# Patient Record
Sex: FEMALE | Race: ASIAN | NOT HISPANIC OR LATINO | Employment: FULL TIME | URBAN - METROPOLITAN AREA
[De-identification: names, ages, dates, MRNs, and addresses within clinical notes are randomized per-mention and may not be internally consistent; named-entity substitution may affect disease eponyms.]

---

## 2021-08-30 ENCOUNTER — OFFICE VISIT (OUTPATIENT)
Dept: INTERNAL MEDICINE | Facility: CLINIC | Age: 26
End: 2021-08-30

## 2021-08-30 VITALS
TEMPERATURE: 98 F | HEIGHT: 61 IN | OXYGEN SATURATION: 98 % | BODY MASS INDEX: 24.17 KG/M2 | WEIGHT: 128 LBS | DIASTOLIC BLOOD PRESSURE: 74 MMHG | SYSTOLIC BLOOD PRESSURE: 100 MMHG | HEART RATE: 62 BPM

## 2021-08-30 DIAGNOSIS — R35.0 URINARY FREQUENCY: ICD-10-CM

## 2021-08-30 DIAGNOSIS — Z00.00 ROUTINE ADULT HEALTH MAINTENANCE: Primary | ICD-10-CM

## 2021-08-30 DIAGNOSIS — Z23 NEED FOR TDAP VACCINATION: ICD-10-CM

## 2021-08-30 LAB
BACTERIA UR QL AUTO: ABNORMAL /HPF
BILIRUB UR QL STRIP: NEGATIVE
CLARITY UR: ABNORMAL
COLOR UR: YELLOW
GLUCOSE UR STRIP-MCNC: NEGATIVE MG/DL
HGB UR QL STRIP.AUTO: ABNORMAL
HYALINE CASTS UR QL AUTO: ABNORMAL /LPF
KETONES UR QL STRIP: NEGATIVE
LEUKOCYTE ESTERASE UR QL STRIP.AUTO: ABNORMAL
NITRITE UR QL STRIP: NEGATIVE
PH UR STRIP.AUTO: 5.5 [PH] (ref 5–8)
PROT UR QL STRIP: NEGATIVE
RBC # UR: ABNORMAL /HPF
REF LAB TEST METHOD: ABNORMAL
SP GR UR STRIP: 1.02 (ref 1–1.03)
SQUAMOUS #/AREA URNS HPF: ABNORMAL /HPF
UROBILINOGEN UR QL STRIP: ABNORMAL
WBC UR QL AUTO: ABNORMAL /HPF

## 2021-08-30 PROCEDURE — 90715 TDAP VACCINE 7 YRS/> IM: CPT | Performed by: NURSE PRACTITIONER

## 2021-08-30 PROCEDURE — 99385 PREV VISIT NEW AGE 18-39: CPT | Performed by: NURSE PRACTITIONER

## 2021-08-30 PROCEDURE — 81001 URINALYSIS AUTO W/SCOPE: CPT | Performed by: NURSE PRACTITIONER

## 2021-08-30 PROCEDURE — 90471 IMMUNIZATION ADMIN: CPT | Performed by: NURSE PRACTITIONER

## 2021-08-30 NOTE — PROGRESS NOTES
"Chief Complaint  Establish Care (New PT ), Annual Exam, and Menstrual Problem    Subjective          Therese Cuevas presents to [facility]  History of Present Illness   This is a 27 y/o female presenting to office for establishment of care and for annual exam. Patient currently works as a  [employer]. Patient is currently  without children.     Patient had last pap smear/gynecological exam in 2018. Patient is not currently using birth control and is currently attempting to get pregnant.  Patient would like to see OBGYN for prenatal care and gynecological needs including PAP smear.     Patient denies tobacco use. Patient denies use of alcohol. Patient denies use of illicit drugs.     Patient reports sedentary lifestyle. Patient reports following healthy diet. Patient reports liking to do active activities with .     Patient reports increased urination-- patient denies dysuria or hematuria. Patient is interested in having UA+CS performed.     Patient reports also experiencing stye in left eye. Patient instructed about how to apply warm compresses.     Objective   Vital Signs:   /74   Pulse 62   Temp 98 °F (36.7 °C)   Ht 154 cm (60.63\")   Wt 58.1 kg (128 lb)   SpO2 98%   BMI 24.48 kg/m²     Physical Exam  Constitutional:       General: She is awake.      Appearance: Normal appearance.   HENT:      Head: Normocephalic.      Right Ear: Hearing and tympanic membrane normal.      Left Ear: Hearing and tympanic membrane normal.      Nose: Nose normal.      Mouth/Throat:      Lips: Pink.      Mouth: Mucous membranes are moist.      Pharynx: Oropharynx is clear.   Eyes:      Extraocular Movements: Extraocular movements intact.      Conjunctiva/sclera: Conjunctivae normal.      Pupils: Pupils are equal, round, and reactive to light.   Cardiovascular:      Rate and Rhythm: Normal rate and regular rhythm.      Pulses: Normal pulses.      Heart sounds: Normal heart " sounds. No murmur heard.   No friction rub. No gallop.    Pulmonary:      Effort: Pulmonary effort is normal. No respiratory distress.      Breath sounds: Normal breath sounds. No stridor. No wheezing, rhonchi or rales.   Abdominal:      General: Abdomen is flat. Bowel sounds are normal. There is no distension.      Palpations: Abdomen is soft. There is no mass.      Tenderness: There is no abdominal tenderness.   Musculoskeletal:         General: Normal range of motion.      Cervical back: Normal range of motion and neck supple.   Skin:     General: Skin is warm and dry.      Capillary Refill: Capillary refill takes less than 2 seconds.   Neurological:      General: No focal deficit present.      Mental Status: She is alert and oriented to person, place, and time. Mental status is at baseline.      Motor: Motor function is intact.      Coordination: Coordination is intact.      Gait: Gait is intact.      Deep Tendon Reflexes: Reflexes are normal and symmetric.   Psychiatric:         Attention and Perception: Attention normal.         Mood and Affect: Mood normal.         Speech: Speech normal.         Behavior: Behavior normal. Behavior is cooperative.         Thought Content: Thought content normal.         Cognition and Memory: Cognition normal.         Judgment: Judgment normal.        Result Review :            Assessment and Plan    Diagnoses and all orders for this visit:    1. Routine adult health maintenance (Primary)  Assessment & Plan:  Encouraged to participate in 150 minutes weekly exercise.  Continue with healthy diet choices according to USDA food pyramid.   A1C/CBC/CMP/Lipid panel/Hep C antibody today.   Pap smear to be completed   Continue with breast self examinations monthly.   Educated to begin taking prenatal vitamin since patient is trying for pregnancy.  Patient also educated regarding importance of COVID 19 precautions and vaccination.   Anticipatory guidance given regarding health  prevention, dietary measures, exercise recommendations, sexual health. Tobacco/alcohol use.       Orders:  -     CBC No Differential  -     Comprehensive metabolic panel  -     Lipid panel  -     Hemoglobin A1c  -     Hepatitis C Antibody  -     Ambulatory Referral to Obstetrics / Gynecology    2. Urinary frequency  -     Urinalysis With Culture If Indicated - Urine, Clean Catch  -     Urinalysis, Microscopic Only - Urine, Clean Catch  -     Urine Culture - Urine, Urine, Clean Catch      Follow Up   Return in about 1 year (around 8/30/2022) for Annual physical.  Patient was given instructions and counseling regarding her condition or for health maintenance advice. Please see specific information pulled into the AVS if appropriate.

## 2021-08-30 NOTE — PATIENT INSTRUCTIONS
Health Maintenance, Female  Adopting a healthy lifestyle and getting preventive care are important in promoting health and wellness. Ask your health care provider about:  · The right schedule for you to have regular tests and exams.  · Things you can do on your own to prevent diseases and keep yourself healthy.  What should I know about diet, weight, and exercise?  Eat a healthy diet    · Eat a diet that includes plenty of vegetables, fruits, low-fat dairy products, and lean protein.  · Do not eat a lot of foods that are high in solid fats, added sugars, or sodium.  Maintain a healthy weight  Body mass index (BMI) is used to identify weight problems. It estimates body fat based on height and weight. Your health care provider can help determine your BMI and help you achieve or maintain a healthy weight.  Get regular exercise  Get regular exercise. This is one of the most important things you can do for your health. Most adults should:  · Exercise for at least 150 minutes each week. The exercise should increase your heart rate and make you sweat (moderate-intensity exercise).  · Do strengthening exercises at least twice a week. This is in addition to the moderate-intensity exercise.  · Spend less time sitting. Even light physical activity can be beneficial.  Watch cholesterol and blood lipids  Have your blood tested for lipids and cholesterol at 20 years of age, then have this test every 5 years.  Have your cholesterol levels checked more often if:  · Your lipid or cholesterol levels are high.  · You are older than 40 years of age.  · You are at high risk for heart disease.  What should I know about cancer screening?  Depending on your health history and family history, you may need to have cancer screening at various ages. This may include screening for:  · Breast cancer.  · Cervical cancer.  · Colorectal cancer.  · Skin cancer.  · Lung cancer.  What should I know about heart disease, diabetes, and high blood  pressure?  Blood pressure and heart disease  · High blood pressure causes heart disease and increases the risk of stroke. This is more likely to develop in people who have high blood pressure readings, are of  descent, or are overweight.  · Have your blood pressure checked:  ? Every 3-5 years if you are 18-39 years of age.  ? Every year if you are 40 years old or older.  Diabetes  Have regular diabetes screenings. This checks your fasting blood sugar level. Have the screening done:  · Once every three years after age 40 if you are at a normal weight and have a low risk for diabetes.  · More often and at a younger age if you are overweight or have a high risk for diabetes.  What should I know about preventing infection?  Hepatitis B  If you have a higher risk for hepatitis B, you should be screened for this virus. Talk with your health care provider to find out if you are at risk for hepatitis B infection.  Hepatitis C  Testing is recommended for:  · Everyone born from 1945 through 1965.  · Anyone with known risk factors for hepatitis C.  Sexually transmitted infections (STIs)  · Get screened for STIs, including gonorrhea and chlamydia, if:  ? You are sexually active and are younger than 24 years of age.  ? You are older than 24 years of age and your health care provider tells you that you are at risk for this type of infection.  ? Your sexual activity has changed since you were last screened, and you are at increased risk for chlamydia or gonorrhea. Ask your health care provider if you are at risk.  · Ask your health care provider about whether you are at high risk for HIV. Your health care provider may recommend a prescription medicine to help prevent HIV infection. If you choose to take medicine to prevent HIV, you should first get tested for HIV. You should then be tested every 3 months for as long as you are taking the medicine.  Pregnancy  · If you are about to stop having your period (premenopausal) and  you may become pregnant, seek counseling before you get pregnant.  · Take 400 to 800 micrograms (mcg) of folic acid every day if you become pregnant.  · Ask for birth control (contraception) if you want to prevent pregnancy.  Osteoporosis and menopause  Osteoporosis is a disease in which the bones lose minerals and strength with aging. This can result in bone fractures. If you are 65 years old or older, or if you are at risk for osteoporosis and fractures, ask your health care provider if you should:  · Be screened for bone loss.  · Take a calcium or vitamin D supplement to lower your risk of fractures.  · Be given hormone replacement therapy (HRT) to treat symptoms of menopause.  Follow these instructions at home:  Lifestyle  · Do not use any products that contain nicotine or tobacco, such as cigarettes, e-cigarettes, and chewing tobacco. If you need help quitting, ask your health care provider.  · Do not use street drugs.  · Do not share needles.  · Ask your health care provider for help if you need support or information about quitting drugs.  Alcohol use  · Do not drink alcohol if:  ? Your health care provider tells you not to drink.  ? You are pregnant, may be pregnant, or are planning to become pregnant.  · If you drink alcohol:  ? Limit how much you use to 0-1 drink a day.  ? Limit intake if you are breastfeeding.  · Be aware of how much alcohol is in your drink. In the [place] one drink equals one 12 oz bottle of beer (355 mL), one 5 oz glass of wine (148 mL), or one 1½ oz glass of hard liquor (44 mL).  General instructions  · Schedule regular health, dental, and eye exams.  · Stay current with your vaccines.  · Tell your health care provider if:  ? You often feel depressed.  ? You have ever been abused or do not feel safe at home.  Summary  · Adopting a healthy lifestyle and getting preventive care are important in promoting health and wellness.  · Follow your health care provider's instructions about healthy  diet, exercising, and getting tested or screened for diseases.  · Follow your health care provider's instructions on monitoring your cholesterol and blood pressure.  This information is not intended to replace advice given to you by your health care provider. Make sure you discuss any questions you have with your health care provider.  Document Revised: 12/11/2019 Document Reviewed: 12/11/2019  [employer] Patient Education © 2021 [employer]    Immunization Schedule, 22-26 Years Old    Vaccines are usually given at various ages, according to a schedule. Your health care provider will recommend vaccines for you based on your age, medical history, and lifestyle or other factors, such as travel or where you work.  You may receive vaccines as individual doses or as more than one vaccine together in one shot (combination vaccine). Talk with your health care provider about the risks and benefits of combination vaccines.  Recommended immunizations for 22-26 years old  Influenza vaccine  · You should get a dose of the influenza vaccine every year.  Tetanus, diphtheria, and pertussis vaccine  A vaccine that protects against tetanus, diphtheria, and pertussis is known as the Tdap vaccine. A vaccine that protects against tetanus and diphtheria is known as the Td vaccine.  · You should only get the Td vaccine if you have had at least 1 dose of the Tdap vaccine.  · You should get 1 dose of the Td or Tdap vaccine every 10 years, or you should get 1 dose of the Tdap vaccine if:  ? You have not previously gotten a Tdap vaccine.  ? You do not know if you have ever gotten a Tdap vaccine.  ? You are between 27 and 36 weeks pregnant.  Measles, mumps, and rubella vaccine  This is also known as the MMR vaccine. You may need to get the MMR vaccine if:  · You need to catch up on doses you missed in the past.  · You have not been given the vaccine before.  · You do not have evidence of immunity (by a blood test).  Pregnant women should not  get the MMR vaccine during pregnancy because it may be harmful to the unborn baby. However, if you are not immune to measles, mumps, or rubella, you should get a dose of MMR vaccine one month or more before pregnancy or within days after delivery.  Varicella vaccine  This is also known as the NICOLASA vaccine. You may need to get the NICOLASA vaccine if:  · You need to catch up on doses you missed in the past.  · You have not been given the vaccine before.  · You do not have evidence of immunity (by a blood test).  · You have certain high-risk conditions, such as HIV or AIDS.  Pregnant women should not get the NICOLASA vaccine during pregnancy because it may be harmful to the unborn baby. However, if you are not immune to chickenpox (varicella), you should get a dose of the NICOLASA vaccine within days after delivery.  Human papillomavirus vaccine  This is also known as the HPV vaccine. You should get the HPV vaccine if:  · You have not gotten the vaccine before. The vaccine is recommended for all adults through age 26.  · You need to catch up on doses you missed in the past.  Pneumococcal conjugate vaccine  This is also known as the PCV13 vaccine. You should get the PCV13 vaccine as recommended if you have certain high-risk conditions. These include:  · Diabetes.  · Chronic conditions of the heart, lungs, or liver.  · Conditions that affect the body's disease-fighting system (immune system).  Pneumococcal polysaccharide vaccine  This is also known as the PPSV23 vaccine. You should get the PPSV23 vaccine as recommended if you have certain high-risk conditions. These include:  · Diabetes.  · Chronic conditions of the heart, lungs, or liver.  · Conditions that affect the immune system.  Hepatitis A vaccine  This is also known as the HepA vaccine. If you did not get the HepA vaccine previously, you should get it if:  · You are at risk for a hepatitis A infection. You may be at risk for infection if you:  ? Have chronic liver  disease.  ? Have HIV or AIDS.  ? Are a man who has sex with men.  ? Use drugs.  ? Are homeless.  ? May be exposed to hepatitis A through work.  ? Travel to countries where hepatitis A is common.  ? Are pregnant.  ? Have or will have close contact with someone who was adopted from another country.  · You are not at risk for infection but want protection from hepatitis A.  Hepatitis B vaccine  This is also known as the HepB vaccine. If you did not get the HepB vaccine previously, you should get it if:  · You are at risk for hepatitis B infection. You are at risk if you:  ? Have chronic liver disease.  ? Have HIV or AIDS.  ? Have sex with a partner who has hepatitis B, or:  § You have multiple sex partners.  § You are a man who has sex with men.  ? Use drugs.  ? May be exposed to hepatitis B through work.  ? Live with someone who has hepatitis B.  ? Receive dialysis treatment.  ? Have diabetes.  ? Travel to countries where hepatitis B is common.  ? Are pregnant.  · You are not at risk of infection but want protection from hepatitis B.  Meningococcal conjugate vaccine  This is also known as the MenACWY vaccine. You may need to get the MenACWY vaccine if you:  · Have not been given the vaccine before.  · Need to catch up on doses you missed in the past.  This vaccine is especially important if you:  · Do not have a spleen.  · Have sickle cell disease.  · Have HIV.  · Take medicines that suppress your immune system.  · Travel to countries where meningococcal disease is common.  · Are exposed to Neisseria meningitidis at work.  Serogroup B meningococcal vaccine  This is also known as the MenB vaccine. You may need to get the MenB vaccine if you:  · Have not been given the vaccine before.  · Need to catch up on doses you missed in the past.  This vaccine is especially important if you:  · Do not have a spleen.  · Have sickle cell disease.  · Take medicines that suppress your immune system.  · Are exposed to Neisseria  meningitidis at work.  Haemophilus influenzae type b vaccine  This is also known as the Hib vaccine. Anyone older than 5 years of age is usually not given the Hib vaccine. However, if you have certain high-risk conditions, you may need to get this vaccine. These conditions include:  · Not having a spleen.  · Having received a stem cell transplant.  Before you get a vaccine:  Talk with your health care provider about which vaccines are right for you. This is especially important if:  · You previously had a reaction after getting a vaccine.  · You have a weakened immune system. You may have a weakened immune system if you:  ? Are taking medicines that reduce (suppress) the activity of your immune system.  ? Are taking medicines to treat cancer (chemotherapy).  ? Have HIV or AIDS.  · You work in an environment where you may be exposed to a disease.  · You plan to travel outside of the country.  · You have a chronic illness, such as heart disease, kidney disease, diabetes, or lung disease.  · You are pregnant, think you may be pregnant, or are planning to become pregnant.  Summary  · Before you get a vaccine, tell your health care provider if you have reacted to vaccines in the past or have a condition that weakens your immune system.  · At 22-26 years, you should get a dose of the flu vaccine every year and a dose of the Td or Tdap vaccine every 10 years.  · Depending on your medical history and your risk factors, you may need other vaccines. Ask your health care provider whether you are up to date on all your vaccines.  · Women who are pregnant may not receive certain vaccines. Ask your health care provider whether you should receive any vaccines soon after you deliver your baby.  This information is not intended to replace advice given to you by your health care provider. Make sure you discuss any questions you have with your health care provider.  Document Revised: 10/13/2020 Document Reviewed: 10/13/2020  [employer]  Patient Education © 2021 [employer]    Prenatal Care  Prenatal care is health care during pregnancy. It helps you and your unborn baby (fetus) stay as healthy as possible. Prenatal care may be provided by a midwife, a family practice health care provider, or a childbirth and pregnancy specialist (obstetrician).  How does this affect me?  During pregnancy, you will be closely monitored for any new conditions that might develop. To lower your risk of pregnancy complications, you and your health care provider will talk about any underlying conditions you have.  How does this affect my baby?  Early and consistent prenatal care increases the chance that your baby will be healthy during pregnancy. Prenatal care lowers the risk that your baby will be:  · Born early (prematurely).  · Smaller than expected at birth (small for gestational age).  What can I expect at the first prenatal care visit?  Your first prenatal care visit will likely be the longest. You should schedule your first prenatal care visit as soon as you know that you are pregnant. Your first visit is a good time to talk about any questions or concerns you have about pregnancy. At your visit, you and your health care provider will talk about:  · Your medical history, including:  ? Any past pregnancies.  ? Your family's medical history.  ? The baby's father's medical history.  ? Any long-term (chronic) health conditions you have and how you manage them.  ? Any surgeries or procedures you have had.  ? Any current over-the-counter or prescription medicines, herbs, or supplements you are taking.  · Other factors that could pose a risk to your baby, including:  · Your home setting and your stress levels, including:  ? Exposure to abuse or violence.  ? Household financial strain.  ? Mental health conditions you have.  · Your daily health habits, including diet and exercise.  Your health care provider will also:  · Measure your weight, height, and blood  pressure.  · Do a physical exam, including a pelvic and breast exam.  · Perform blood tests and urine tests to check for:  ? Urinary tract infection.  ? Sexually transmitted infections (STIs).  ? Low iron levels in your blood (anemia).  ? Blood type and certain proteins on red blood cells (Rh antibodies).  ? Infections and immunity to viruses, such as hepatitis B and rubella.  ? HIV (human immunodeficiency virus).  · Do an ultrasound to confirm your baby's growth and development and to help predict your estimated due date (GIA). This ultrasound is done with a probe that is inserted into the vagina (transvaginal ultrasound).  · Discuss your options for genetic screening.  · Give you information about how to keep yourself and your baby healthy, including:  ? Nutrition and taking vitamins.  ? Physical activity.  ? How to manage pregnancy symptoms such as nausea and vomiting (morning sickness).  ? Infections and substances that may be harmful to your baby and how to avoid them.  ? Food safety.  ? Dental care.  ? Working.  ? Travel.  ? Warning signs to watch for and when to call your health care provider.  How often will I have prenatal care visits?  After your first prenatal care visit, you will have regular visits throughout your pregnancy. The visit schedule is often as follows:  · Up to week 28 of pregnancy: once every 4 weeks.  · 28-36 weeks: once every 2 weeks.  · After 36 weeks: every week until delivery.  Some women may have visits more or less often depending on any underlying health conditions and the health of the baby.  Keep all follow-up and prenatal care visits as told by your health care provider. This is important.  What happens during routine prenatal care visits?  Your health care provider will:  · Measure your weight and blood pressure.  · Check for fetal heart sounds.  · Measure the height of your uterus in your abdomen (fundal height). This may be measured starting around week 20 of  pregnancy.  · Check the position of your baby inside your uterus.  · Ask questions about your diet, sleeping patterns, and whether you can feel the baby move.  · Review warning signs to watch for and signs of labor.  · Ask about any pregnancy symptoms you are having and how you are dealing with them. Symptoms may include:  ? Headaches.  ? Nausea and vomiting.  ? Vaginal discharge.  ? Swelling.  ? Fatigue.  ? Constipation.  ? Any discomfort, including back or pelvic pain.  Make a list of questions to ask your health care provider at your routine visits.  What tests might I have during prenatal care visits?  You may have blood, urine, and imaging tests throughout your pregnancy, such as:  · Urine tests to check for glucose, protein, or signs of infection.  · Glucose tests to check for a form of diabetes that can develop during pregnancy (gestational diabetes mellitus). This is usually done around week 24 of pregnancy.  · An ultrasound to check your baby's growth and development and to check for birth defects. This is usually done around week 20 of pregnancy.  · A test to check for group B strep (GBS) infection. This is usually done around week 36 of pregnancy.  · Genetic testing. This may include blood or imaging tests, such as an ultrasound. Some genetic tests are done during the first trimester and some are done during the second trimester.  What else can I expect during prenatal care visits?  Your health care provider may recommend getting certain vaccines during pregnancy. These may include:  · A yearly flu shot (annual influenza vaccine). This is especially important if you will be pregnant during flu season.  · Tdap (tetanus, diphtheria, pertussis) vaccine. Getting this vaccine during pregnancy can protect your baby from whooping cough (pertussis) after birth. This vaccine may be recommended between weeks 27 and 36 of pregnancy.  Later in your pregnancy, your health care provider may give you information  about:  · Childbirth and breastfeeding classes.  · Choosing a health care provider for your baby.  · Umbilical cord banking.  · Breastfeeding.  · Birth control after your baby is born.  · The hospital labor and delivery unit and how to tour it.  · Registering at the hospital before you go into labor.  Where to find more information  · Office on Women's Health: womenshealth.gov  · American Pregnancy Association: americanpregnancy.org  · March of Dimes: marchofdimes.org  Summary  · Prenatal care helps you and your baby stay as healthy as possible during pregnancy.  · Your first prenatal care visit will most likely be the longest.  · You will have visits and tests throughout your pregnancy to monitor your health and your baby's health.  · Bring a list of questions to your visits to ask your health care provider.  · Make sure to keep all follow-up and prenatal care visits with your health care provider.  This information is not intended to replace advice given to you by your health care provider. Make sure you discuss any questions you have with your health care provider.  Document Revised: 04/08/2020 Document Reviewed: 12/17/2018  [employer] Patient Education © 2021 [employer]    Preventive Care 21-39 Years Old, Female  Preventive care refers to lifestyle choices and visits with your health care provider that can promote health and wellness. This includes:  · A yearly physical exam. This is also called an annual wellness visit.  · Regular dental and eye exams.  · Immunizations.  · Screening for certain conditions.  · Healthy lifestyle choices, such as:  ? Eating a healthy diet.  ? Getting regular exercise.  ? Not using drugs or products that contain nicotine and tobacco.  ? Limiting alcohol use.  What can I expect for my preventive care visit?  Physical exam  Your health care provider may check your:  · Height and weight. These may be used to calculate your BMI (body mass index). BMI is a measurement that tells if you  are at a healthy weight.  · Heart rate and blood pressure.  · Body temperature.  · Skin for abnormal spots.  Counseling  Your health care provider may ask you questions about your:  · Past medical problems.  · Family's medical history.  · Alcohol, tobacco, and drug use.  · Emotional well-being.  · Home life and relationship well-being.  · Sexual activity.  · Diet, exercise, and sleep habits.  · Work and work environment.  · Access to firearms.  · Method of birth control.  · Menstrual cycle.  · Pregnancy history.  What immunizations do I need?    Vaccines are usually given at various ages, according to a schedule. Your health care provider will recommend vaccines for you based on your age, medical history, and lifestyle or other factors, such as travel or where you work.  What tests do I need?    Blood tests  · Lipid and cholesterol levels. These may be checked every 5 years starting at age 20.  · Hepatitis C test.  · Hepatitis B test.  Screening  · Diabetes screening. This is done by checking your blood sugar (glucose) after you have not eaten for a while (fasting).  · STD (sexually transmitted disease) testing, if you are at risk.  · BRCA-related cancer screening. This may be done if you have a family history of breast, ovarian, tubal, or peritoneal cancers.  · Pelvic exam and Pap test. This may be done every 3 years starting at age 21. Starting at age 30, this may be done every 5 years if you have a Pap test in combination with an HPV test.  Talk with your health care provider about your test results, treatment options, and if necessary, the need for more tests.  Follow these instructions at home:  Eating and drinking    · Eat a healthy diet that includes fresh fruits and vegetables, whole grains, lean protein, and low-fat dairy products.  · Take vitamin and mineral supplements as recommended by your health care provider.  · Do not drink alcohol if:  ? Your health care provider tells you not to drink.  ? You are  pregnant, may be pregnant, or are planning to become pregnant.  · If you drink alcohol:  ? Limit how much you have to 0-1 drink a day.  ? Be aware of how much alcohol is in your drink. In the [place] one drink equals one 12 oz bottle of beer (355 mL), one 5 oz glass of wine (148 mL), or one 1½ oz glass of hard liquor (44 mL).  Lifestyle  · Take daily care of your teeth and gums. Brush your teeth every morning and night with fluoride toothpaste. Floss one time each day.  · Stay active. Exercise for at least 30 minutes 5 or more days each week.  · Do not use any products that contain nicotine or tobacco, such as cigarettes, e-cigarettes, and chewing tobacco. If you need help quitting, ask your health care provider.  · Do not use drugs.  · If you are sexually active, practice safe sex. Use a condom or other form of birth control (contraception) in order to prevent pregnancy and STIs (sexually transmitted infections). If you plan to become pregnant, see your health care provider for a pre-conception visit.  · Find healthy ways to cope with stress, such as:  ? Meditation, yoga, or listening to music.  ? Journaling.  ? Talking to a trusted person.  ? Spending time with friends and family.  Safety  · Always wear your seat belt while driving or riding in a vehicle.  · Do not drive:  ? If you have been drinking alcohol. Do not ride with someone who has been drinking.  ? When you are tired or distracted.  ? While texting.  · Wear a helmet and other protective equipment during sports activities.  · If you have firearms in your house, make sure you follow all gun safety procedures.  · Seek help if you have been physically or sexually abused.  What's next?  · Go to your health care provider once a year for an annual wellness visit.  · Ask your health care provider how often you should have your eyes and teeth checked.  · Stay up to date on all vaccines.  This information is not intended to replace advice given to you by your  health care provider. Make sure you discuss any questions you have with your health care provider.  Document Revised: 09/02/2020 Document Reviewed: 08/29/2019  Elsevier Patient Education © 2021 [employer]

## 2021-08-30 NOTE — ASSESSMENT & PLAN NOTE
Encouraged to participate in 150 minutes weekly exercise.  Continue with healthy diet choices according to USDA food pyramid.   A1C/CBC/CMP/Lipid panel/Hep C antibody today.   Pap smear to be completed   Continue with breast self examinations monthly.   Educated to begin taking prenatal vitamin since patient is trying for pregnancy.  Patient also educated regarding importance of COVID 19 precautions and vaccination.   Anticipatory guidance given regarding health prevention, dietary measures, exercise recommendations, sexual health. Tobacco/alcohol use.

## 2021-08-31 LAB
ALBUMIN SERPL-MCNC: 5.2 G/DL (ref 3.5–5.2)
ALBUMIN/GLOB SERPL: 1.8 G/DL
ALP SERPL-CCNC: 38 U/L (ref 39–117)
ALT SERPL-CCNC: 16 U/L (ref 1–33)
AST SERPL-CCNC: 18 U/L (ref 1–32)
BILIRUB SERPL-MCNC: 0.3 MG/DL (ref 0–1.2)
BUN SERPL-MCNC: 11 MG/DL (ref 6–20)
BUN/CREAT SERPL: 16.4 (ref 7–25)
CALCIUM SERPL-MCNC: 9.9 MG/DL (ref 8.6–10.5)
CHLORIDE SERPL-SCNC: 102 MMOL/L (ref 98–107)
CHOLEST SERPL-MCNC: 177 MG/DL (ref 0–200)
CO2 SERPL-SCNC: 25.1 MMOL/L (ref 22–29)
CREAT SERPL-MCNC: 0.67 MG/DL (ref 0.57–1)
ERYTHROCYTE [DISTWIDTH] IN BLOOD BY AUTOMATED COUNT: 12.3 % (ref 12.3–15.4)
GLOBULIN SER CALC-MCNC: 2.9 GM/DL
GLUCOSE SERPL-MCNC: 85 MG/DL (ref 65–99)
HBA1C MFR BLD: 5.3 % (ref 4.8–5.6)
HCT VFR BLD AUTO: 46 % (ref 34–46.6)
HCV AB S/CO SERPL IA: <0.1 S/CO RATIO (ref 0–0.9)
HDLC SERPL-MCNC: 67 MG/DL (ref 40–60)
HGB BLD-MCNC: 14.7 G/DL (ref 12–15.9)
LDLC SERPL CALC-MCNC: 91 MG/DL (ref 0–100)
MCH RBC QN AUTO: 28.5 PG (ref 26.6–33)
MCHC RBC AUTO-ENTMCNC: 32 G/DL (ref 31.5–35.7)
MCV RBC AUTO: 89.3 FL (ref 79–97)
PLATELET # BLD AUTO: 433 10*3/MM3 (ref 140–450)
POTASSIUM SERPL-SCNC: 4.1 MMOL/L (ref 3.5–5.2)
PROT SERPL-MCNC: 8.1 G/DL (ref 6–8.5)
RBC # BLD AUTO: 5.15 10*6/MM3 (ref 3.77–5.28)
SODIUM SERPL-SCNC: 138 MMOL/L (ref 136–145)
TRIGL SERPL-MCNC: 107 MG/DL (ref 0–150)
VLDLC SERPL CALC-MCNC: 19 MG/DL (ref 5–40)
WBC # BLD AUTO: 6.89 10*3/MM3 (ref 3.4–10.8)

## 2021-08-31 NOTE — PROGRESS NOTES
Please let patient know her preliminary UA is indicated that there is bacteria in her urine (Most likely UTI). Once we get the culture back with sensitivity, I will send an antibiotic to her pharmacy.

## 2021-09-01 LAB
BACTERIA UR CULT: NORMAL
BACTERIA UR CULT: NORMAL

## 2021-09-10 LAB — REF LAB TEST METHOD: NORMAL

## 2024-05-06 ENCOUNTER — OFFICE VISIT (OUTPATIENT)
Dept: INTERNAL MEDICINE | Facility: CLINIC | Age: 29
End: 2024-05-06
Payer: COMMERCIAL

## 2024-05-06 VITALS
OXYGEN SATURATION: 98 % | HEIGHT: 61 IN | DIASTOLIC BLOOD PRESSURE: 80 MMHG | BODY MASS INDEX: 24.55 KG/M2 | WEIGHT: 130 LBS | SYSTOLIC BLOOD PRESSURE: 130 MMHG | HEART RATE: 68 BPM

## 2024-05-06 DIAGNOSIS — H10.9 BACTERIAL CONJUNCTIVITIS OF RIGHT EYE: ICD-10-CM

## 2024-05-06 DIAGNOSIS — H10.9 BACTERIAL CONJUNCTIVITIS OF RIGHT EYE: Primary | ICD-10-CM

## 2024-05-06 PROCEDURE — 99213 OFFICE O/P EST LOW 20 MIN: CPT | Performed by: NURSE PRACTITIONER

## 2024-05-06 RX ORDER — VITAMIN A ACETATE, BETA CAROTENE, ASCORBIC ACID, CHOLECALCIFEROL, .ALPHA.-TOCOPHEROL ACETATE, DL-, THIAMINE MONONITRATE, RIBOFLAVIN, NIACINAMIDE, PYRIDOXINE HYDROCHLORIDE, FOLIC ACID, CYANOCOBALAMIN, CALCIUM CARBONATE, FERROUS FUMARATE, ZINC OXIDE, CUPRIC OXIDE 3080; 12; 120; 400; 1; 1.84; 3; 20; 22; 920; 25; 200; 27; 10; 2 [IU]/1; UG/1; MG/1; [IU]/1; MG/1; MG/1; MG/1; MG/1; MG/1; [IU]/1; MG/1; MG/1; MG/1; MG/1; MG/1
TABLET, FILM COATED ORAL DAILY
COMMUNITY

## 2024-05-06 RX ORDER — FOLIC ACID 0.8 MG
TABLET ORAL
COMMUNITY

## 2024-05-06 RX ORDER — CIPROFLOXACIN HYDROCHLORIDE 3.5 MG/ML
1 SOLUTION/ DROPS TOPICAL 4 TIMES DAILY
Qty: 2.5 ML | Refills: 0 | Status: SHIPPED | OUTPATIENT
Start: 2024-05-06 | End: 2024-05-06 | Stop reason: SDUPTHER

## 2024-05-07 RX ORDER — CIPROFLOXACIN HYDROCHLORIDE 3.5 MG/ML
1 SOLUTION/ DROPS TOPICAL 4 TIMES DAILY
Qty: 2.5 ML | Refills: 0 | Status: SHIPPED | OUTPATIENT
Start: 2024-05-07 | End: 2024-05-14

## 2024-05-22 ENCOUNTER — OFFICE VISIT (OUTPATIENT)
Dept: INTERNAL MEDICINE | Facility: CLINIC | Age: 29
End: 2024-05-22
Payer: COMMERCIAL

## 2024-05-22 VITALS
DIASTOLIC BLOOD PRESSURE: 80 MMHG | SYSTOLIC BLOOD PRESSURE: 120 MMHG | OXYGEN SATURATION: 99 % | WEIGHT: 136 LBS | HEART RATE: 73 BPM | HEIGHT: 61 IN | BODY MASS INDEX: 25.68 KG/M2

## 2024-05-22 DIAGNOSIS — E66.3 OVERWEIGHT WITH BODY MASS INDEX (BMI) OF 26 TO 26.9 IN ADULT: ICD-10-CM

## 2024-05-22 DIAGNOSIS — M25.562 ACUTE PAIN OF LEFT KNEE: ICD-10-CM

## 2024-05-22 DIAGNOSIS — Z00.00 ANNUAL PHYSICAL EXAM: Primary | ICD-10-CM

## 2024-05-22 PROCEDURE — 99395 PREV VISIT EST AGE 18-39: CPT | Performed by: NURSE PRACTITIONER

## 2024-05-22 PROCEDURE — 99213 OFFICE O/P EST LOW 20 MIN: CPT | Performed by: NURSE PRACTITIONER

## 2024-05-22 NOTE — PROGRESS NOTES
"Chief Complaint  Annual Exam and Knee Problem    Subjective        Therese Cuevas presents to [facility]  History of Present Illness  This is a 28 y/o female presenting to office for CPE and complaint of knee issue.     Reports some exercise; trying to follow healthy diet choices  Currently, breast feeding-- child now 10 months.     Patient reports she is currently newly pregnant-- took test last Tuesday and it was positive; has f/u with OBGYN scheduled.    Following with gynecology-- Jaye Olsen   Pap smear 2022     Denies tobacco use.     Reports left knee pain started 1.5 weeks ago; reports she may have injured it by placing some sort of weight on it with her other leg; reports she is still having some tenderness. Denies any trauma or falling. Reports she is taking tylenol as needed.     UTD with dental exam     Objective   Vital Signs:  /80 (BP Location: Left arm, Patient Position: Sitting, Cuff Size: Adult)   Pulse 73   Ht 154 cm (60.63\")   Wt 61.7 kg (136 lb)   SpO2 99%   BMI 26.01 kg/m²   Estimated body mass index is 26.01 kg/m² as calculated from the following:    Height as of this encounter: 154 cm (60.63\").    Weight as of this encounter: 61.7 kg (136 lb).       BMI is >= 25 and <30. (Overweight) The following options were offered after discussion;: exercise counseling/recommendations and nutrition counseling/recommendations      Physical Exam  Constitutional:       General: She is awake.      Appearance: Normal appearance.   HENT:      Head: Normocephalic and atraumatic.      Right Ear: Hearing, tympanic membrane, ear canal and external ear normal.      Left Ear: Hearing, tympanic membrane, ear canal and external ear normal.      Nose: Nose normal.      Mouth/Throat:      Lips: Pink.      Mouth: Mucous membranes are moist.      Pharynx: Oropharynx is clear.   Eyes:      Extraocular Movements: Extraocular movements intact.      Conjunctiva/sclera: Conjunctivae " normal.      Pupils: Pupils are equal, round, and reactive to light.   Cardiovascular:      Rate and Rhythm: Normal rate and regular rhythm.      Pulses: Normal pulses.      Heart sounds: Normal heart sounds. No murmur heard.     No gallop.   Pulmonary:      Effort: Pulmonary effort is normal. No respiratory distress.      Breath sounds: Normal breath sounds. No wheezing or rales.   Abdominal:      General: Abdomen is flat. Bowel sounds are normal. There is no distension.      Palpations: Abdomen is soft.      Tenderness: There is no abdominal tenderness.   Musculoskeletal:         General: Tenderness present. No swelling or deformity.      Cervical back: Normal range of motion and neck supple.      Left knee: Decreased range of motion. Tenderness present.   Lymphadenopathy:      Cervical: No cervical adenopathy.   Skin:     General: Skin is warm and dry.      Capillary Refill: Capillary refill takes less than 2 seconds.      Findings: No bruising.   Neurological:      General: No focal deficit present.      Mental Status: She is alert and oriented to person, place, and time. Mental status is at baseline.      Motor: Motor function is intact.      Coordination: Coordination is intact.      Gait: Gait is intact.      Deep Tendon Reflexes: Reflexes are normal and symmetric.   Psychiatric:         Attention and Perception: Attention normal.         Mood and Affect: Mood normal.         Speech: Speech normal.         Behavior: Behavior normal. Behavior is cooperative.         Thought Content: Thought content normal.         Cognition and Memory: Cognition normal.         Judgment: Judgment normal.        Result Review :    The following data was reviewed by: EBONI Fernandez on 05/22/2024:  Tobacco Use: Low Risk  (5/22/2024)    Patient History     Smoking Tobacco Use: Never     Smokeless Tobacco Use: Never     Passive Exposure: Not on file     Social History     Substance and Sexual Activity   Alcohol Use Never      Family History   Problem Relation Age of Onset    No Known Problems Other                     Assessment and Plan     Diagnoses and all orders for this visit:    1. Annual physical exam (Primary)  Assessment & Plan:  Recommended 150-300 min weekly exercise as tolerated-- may need to avoid exercise for now until cleared by PT  Continue with healthy diet choices  Labs ordered  Pap smear UTD 2022-- deferred to gynecology  Recommended monthly self breast examinations  Anticipatory guidance given regarding health prevention/wellness, diet/exercise, tobacco/alcohol/drug education, exercise and wellbeing, covid 19 guidance, vaccination recommendations, and sexual health/STD education.   Recommended bi-yearly dental exams and regular vision examinations.       Orders:  -     CBC & Differential  -     Comprehensive metabolic panel  -     TSH Rfx On Abnormal To Free T4  -     Hemoglobin A1c  -     LDL cholesterol, direct    2. Acute pain of left knee  Assessment & Plan:  Due to pregnancy-- will need to avoid NSAIDs  Can use tylenol PRN OTC for pain relief  Biofreeze topically as needed  Referral placed to PT  Hand out provided  RTO if symptoms worsen or do not improve    Orders:  -     Ambulatory Referral to Physical Therapy    3. Overweight with body mass index (BMI) of 26 to 26.9 in adult  Assessment & Plan:  BMI is >= 25 and <30. (Overweight) The following options were offered after discussion;: exercise counseling/recommendations and nutrition counseling/recommendations  Currently pregnant                Follow Up     Return in about 1 year (around 5/22/2025) for Annual physical.  Patient was given instructions and counseling regarding her condition or for health maintenance advice. Please see specific information pulled into the AVS if appropriate.

## 2024-05-22 NOTE — ASSESSMENT & PLAN NOTE
Due to pregnancy-- will need to avoid NSAIDs  Can use tylenol PRN OTC for pain relief  Biofreeze topically as needed  Referral placed to PT  Hand out provided  RTO if symptoms worsen or do not improve

## 2024-05-22 NOTE — ASSESSMENT & PLAN NOTE
Recommended 150-300 min weekly exercise as tolerated-- may need to avoid exercise for now until cleared by PT  Continue with healthy diet choices  Labs ordered  Pap smear UTD 2022-- deferred to gynecology  Recommended monthly self breast examinations  Anticipatory guidance given regarding health prevention/wellness, diet/exercise, tobacco/alcohol/drug education, exercise and wellbeing, covid 19 guidance, vaccination recommendations, and sexual health/STD education.   Recommended bi-yearly dental exams and regular vision examinations.

## 2024-05-22 NOTE — ASSESSMENT & PLAN NOTE
BMI is >= 25 and <30. (Overweight) The following options were offered after discussion;: exercise counseling/recommendations and nutrition counseling/recommendations  Currently pregnant

## 2024-05-23 LAB
ALBUMIN SERPL-MCNC: 4.8 G/DL (ref 3.5–5.2)
ALBUMIN/GLOB SERPL: 1.9 G/DL
ALP SERPL-CCNC: 48 U/L (ref 39–117)
ALT SERPL-CCNC: 19 U/L (ref 1–33)
AST SERPL-CCNC: 18 U/L (ref 1–32)
BASOPHILS # BLD AUTO: 0.07 10*3/MM3 (ref 0–0.2)
BASOPHILS NFR BLD AUTO: 0.8 % (ref 0–1.5)
BILIRUB SERPL-MCNC: 0.4 MG/DL (ref 0–1.2)
BUN SERPL-MCNC: 11 MG/DL (ref 6–20)
BUN/CREAT SERPL: 20.4 (ref 7–25)
CALCIUM SERPL-MCNC: 9.9 MG/DL (ref 8.6–10.5)
CHLORIDE SERPL-SCNC: 103 MMOL/L (ref 98–107)
CO2 SERPL-SCNC: 23.6 MMOL/L (ref 22–29)
CREAT SERPL-MCNC: 0.54 MG/DL (ref 0.57–1)
EGFRCR SERPLBLD CKD-EPI 2021: 128 ML/MIN/1.73
EOSINOPHIL # BLD AUTO: 0.2 10*3/MM3 (ref 0–0.4)
EOSINOPHIL NFR BLD AUTO: 2.3 % (ref 0.3–6.2)
ERYTHROCYTE [DISTWIDTH] IN BLOOD BY AUTOMATED COUNT: 13.1 % (ref 12.3–15.4)
GLOBULIN SER CALC-MCNC: 2.5 GM/DL
GLUCOSE SERPL-MCNC: 73 MG/DL (ref 65–99)
HBA1C MFR BLD: 5.3 % (ref 4.8–5.6)
HCT VFR BLD AUTO: 42.7 % (ref 34–46.6)
HGB BLD-MCNC: 13.8 G/DL (ref 12–15.9)
IMM GRANULOCYTES # BLD AUTO: 0.03 10*3/MM3 (ref 0–0.05)
IMM GRANULOCYTES NFR BLD AUTO: 0.4 % (ref 0–0.5)
LDLC SERPL DIRECT ASSAY-MCNC: 67 MG/DL (ref 0–100)
LYMPHOCYTES # BLD AUTO: 2.25 10*3/MM3 (ref 0.7–3.1)
LYMPHOCYTES NFR BLD AUTO: 26.3 % (ref 19.6–45.3)
MCH RBC QN AUTO: 29.1 PG (ref 26.6–33)
MCHC RBC AUTO-ENTMCNC: 32.3 G/DL (ref 31.5–35.7)
MCV RBC AUTO: 89.9 FL (ref 79–97)
MONOCYTES # BLD AUTO: 0.63 10*3/MM3 (ref 0.1–0.9)
MONOCYTES NFR BLD AUTO: 7.4 % (ref 5–12)
NEUTROPHILS # BLD AUTO: 5.36 10*3/MM3 (ref 1.7–7)
NEUTROPHILS NFR BLD AUTO: 62.8 % (ref 42.7–76)
NRBC BLD AUTO-RTO: 0 /100 WBC (ref 0–0.2)
PLATELET # BLD AUTO: 435 10*3/MM3 (ref 140–450)
POTASSIUM SERPL-SCNC: 3.9 MMOL/L (ref 3.5–5.2)
PROT SERPL-MCNC: 7.3 G/DL (ref 6–8.5)
RBC # BLD AUTO: 4.75 10*6/MM3 (ref 3.77–5.28)
SODIUM SERPL-SCNC: 139 MMOL/L (ref 136–145)
TSH SERPL DL<=0.005 MIU/L-ACNC: 1.7 UIU/ML (ref 0.27–4.2)
WBC # BLD AUTO: 8.54 10*3/MM3 (ref 3.4–10.8)

## 2025-08-20 ENCOUNTER — RESULTS FOLLOW-UP (OUTPATIENT)
Dept: INTERNAL MEDICINE | Facility: CLINIC | Age: 30
End: 2025-08-20

## 2025-08-20 ENCOUNTER — OFFICE VISIT (OUTPATIENT)
Dept: INTERNAL MEDICINE | Facility: CLINIC | Age: 30
End: 2025-08-20
Payer: COMMERCIAL

## 2025-08-20 ENCOUNTER — HOSPITAL ENCOUNTER (OUTPATIENT)
Facility: HOSPITAL | Age: 30
Discharge: HOME | End: 2025-08-20
Admitting: NURSE PRACTITIONER
Payer: COMMERCIAL

## 2025-08-20 VITALS
HEIGHT: 61 IN | OXYGEN SATURATION: 98 % | DIASTOLIC BLOOD PRESSURE: 82 MMHG | HEART RATE: 64 BPM | BODY MASS INDEX: 27 KG/M2 | WEIGHT: 143 LBS | SYSTOLIC BLOOD PRESSURE: 120 MMHG

## 2025-08-20 DIAGNOSIS — Z00.00 ANNUAL PHYSICAL EXAM: Primary | ICD-10-CM

## 2025-08-20 DIAGNOSIS — M79.672 LEFT FOOT PAIN: ICD-10-CM

## 2025-08-20 DIAGNOSIS — D75.839 THROMBOCYTOSIS: Primary | ICD-10-CM

## 2025-08-20 DIAGNOSIS — R20.0 NUMBNESS OF LEFT HAND: ICD-10-CM

## 2025-08-20 DIAGNOSIS — N39.46 MIXED INCONTINENCE: ICD-10-CM

## 2025-08-20 PROCEDURE — 73630 X-RAY EXAM OF FOOT: CPT

## 2025-08-22 ENCOUNTER — LAB (OUTPATIENT)
Facility: HOSPITAL | Age: 30
End: 2025-08-22
Payer: COMMERCIAL

## 2025-08-22 LAB
ALBUMIN SERPL-MCNC: 4.6 G/DL (ref 3.5–5.2)
ALBUMIN/GLOB SERPL: 1.3 G/DL
ALP SERPL-CCNC: 46 U/L (ref 39–117)
ALT SERPL W P-5'-P-CCNC: 15 U/L (ref 1–33)
ANION GAP SERPL CALCULATED.3IONS-SCNC: 9.7 MMOL/L (ref 5–15)
ANISOCYTOSIS BLD QL: ABNORMAL
AST SERPL-CCNC: 12 U/L (ref 1–32)
BASOPHILS # BLD MANUAL: 0 10*3/MM3 (ref 0–0.2)
BASOPHILS NFR BLD MANUAL: 0 % (ref 0–1.5)
BILIRUB SERPL-MCNC: 0.4 MG/DL (ref 0–1.2)
BUN SERPL-MCNC: 11 MG/DL (ref 6–20)
BUN/CREAT SERPL: 15.9 (ref 7–25)
CALCIUM SPEC-SCNC: 9.7 MG/DL (ref 8.6–10.5)
CHLORIDE SERPL-SCNC: 104 MMOL/L (ref 98–107)
CHOLEST SERPL-MCNC: 154 MG/DL (ref 0–200)
CO2 SERPL-SCNC: 24.3 MMOL/L (ref 22–29)
CREAT SERPL-MCNC: 0.69 MG/DL (ref 0.57–1)
DEPRECATED RDW RBC AUTO: 40.2 FL (ref 37–54)
EGFRCR SERPLBLD CKD-EPI 2021: 119.9 ML/MIN/1.73
EOSINOPHIL # BLD MANUAL: 0.17 10*3/MM3 (ref 0–0.4)
EOSINOPHIL NFR BLD MANUAL: 3 % (ref 0.3–6.2)
ERYTHROCYTE [DISTWIDTH] IN BLOOD BY AUTOMATED COUNT: 12.7 % (ref 12.3–15.4)
GLOBULIN UR ELPH-MCNC: 3.6 GM/DL
GLUCOSE SERPL-MCNC: 90 MG/DL (ref 65–99)
HBA1C MFR BLD: 5.6 % (ref 4.8–5.6)
HCT VFR BLD AUTO: 43.7 % (ref 34–46.6)
HDLC SERPL-MCNC: 47 MG/DL (ref 40–60)
HGB BLD-MCNC: 14.5 G/DL (ref 12–15.9)
LDLC SERPL CALC-MCNC: 95 MG/DL (ref 0–100)
LDLC/HDLC SERPL: 2.03 {RATIO}
LYMPHOCYTES # BLD MANUAL: 2.11 10*3/MM3 (ref 0.7–3.1)
LYMPHOCYTES NFR BLD MANUAL: 4 % (ref 5–12)
MAGNESIUM SERPL-MCNC: 2.1 MG/DL (ref 1.6–2.6)
MCH RBC QN AUTO: 28.8 PG (ref 26.6–33)
MCHC RBC AUTO-ENTMCNC: 33.2 G/DL (ref 31.5–35.7)
MCV RBC AUTO: 86.9 FL (ref 79–97)
MONOCYTES # BLD: 0.22 10*3/MM3 (ref 0.1–0.9)
NEUTROPHILS # BLD AUTO: 3.05 10*3/MM3 (ref 1.7–7)
NEUTROPHILS NFR BLD MANUAL: 55 % (ref 42.7–76)
PLAT MORPH BLD: NORMAL
PLATELET # BLD AUTO: 496 10*3/MM3 (ref 140–450)
PMV BLD AUTO: 9.6 FL (ref 6–12)
POTASSIUM SERPL-SCNC: 4.1 MMOL/L (ref 3.5–5.2)
PROT SERPL-MCNC: 8.2 G/DL (ref 6–8.5)
RBC # BLD AUTO: 5.03 10*6/MM3 (ref 3.77–5.28)
SODIUM SERPL-SCNC: 138 MMOL/L (ref 136–145)
TRIGL SERPL-MCNC: 58 MG/DL (ref 0–150)
TSH SERPL DL<=0.05 MIU/L-ACNC: 1.03 UIU/ML (ref 0.27–4.2)
VARIANT LYMPHS NFR BLD MANUAL: 38 % (ref 19.6–45.3)
VIT B12 BLD-MCNC: 1419 PG/ML (ref 211–946)
VLDLC SERPL-MCNC: 12 MG/DL (ref 5–40)
WBC MORPH BLD: NORMAL
WBC NRBC COR # BLD AUTO: 5.54 10*3/MM3 (ref 3.4–10.8)

## 2025-08-22 PROCEDURE — 36415 COLL VENOUS BLD VENIPUNCTURE: CPT | Performed by: NURSE PRACTITIONER
